# Patient Record
Sex: MALE | Race: WHITE
[De-identification: names, ages, dates, MRNs, and addresses within clinical notes are randomized per-mention and may not be internally consistent; named-entity substitution may affect disease eponyms.]

---

## 2018-10-09 ENCOUNTER — HOSPITAL ENCOUNTER (EMERGENCY)
Dept: HOSPITAL 47 - EC | Age: 33
Discharge: HOME | End: 2018-10-09
Payer: COMMERCIAL

## 2018-10-09 VITALS — DIASTOLIC BLOOD PRESSURE: 69 MMHG | HEART RATE: 78 BPM | TEMPERATURE: 98.5 F | SYSTOLIC BLOOD PRESSURE: 143 MMHG

## 2018-10-09 VITALS — RESPIRATION RATE: 16 BRPM

## 2018-10-09 DIAGNOSIS — E11.65: ICD-10-CM

## 2018-10-09 DIAGNOSIS — Z76.0: ICD-10-CM

## 2018-10-09 DIAGNOSIS — Z91.14: ICD-10-CM

## 2018-10-09 DIAGNOSIS — Z88.8: ICD-10-CM

## 2018-10-09 DIAGNOSIS — J20.9: Primary | ICD-10-CM

## 2018-10-09 DIAGNOSIS — F17.200: ICD-10-CM

## 2018-10-09 LAB
ALBUMIN SERPL-MCNC: 4.1 G/DL (ref 3.5–5)
ALP SERPL-CCNC: 74 U/L (ref 38–126)
ALT SERPL-CCNC: 19 U/L (ref 21–72)
ANION GAP SERPL CALC-SCNC: 10 MMOL/L
AST SERPL-CCNC: 17 U/L (ref 17–59)
BASOPHILS # BLD AUTO: 0.1 K/UL (ref 0–0.2)
BASOPHILS NFR BLD AUTO: 1 %
BUN SERPL-SCNC: 7 MG/DL (ref 9–20)
CALCIUM SPEC-MCNC: 9.5 MG/DL (ref 8.4–10.2)
CHLORIDE SERPL-SCNC: 102 MMOL/L (ref 98–107)
CO2 SERPL-SCNC: 27 MMOL/L (ref 22–30)
EOSINOPHIL # BLD AUTO: 0.4 K/UL (ref 0–0.7)
EOSINOPHIL NFR BLD AUTO: 4 %
ERYTHROCYTE [DISTWIDTH] IN BLOOD BY AUTOMATED COUNT: 4.85 M/UL (ref 4.3–5.9)
ERYTHROCYTE [DISTWIDTH] IN BLOOD: 13.8 % (ref 11.5–15.5)
GLUCOSE BLD-MCNC: 228 MG/DL (ref 75–99)
GLUCOSE SERPL-MCNC: 218 MG/DL (ref 74–99)
GLUCOSE UR QL: (no result)
HCT VFR BLD AUTO: 42.1 % (ref 39–53)
HGB BLD-MCNC: 14.1 GM/DL (ref 13–17.5)
LYMPHOCYTES # SPEC AUTO: 3.3 K/UL (ref 1–4.8)
LYMPHOCYTES NFR SPEC AUTO: 29 %
MCH RBC QN AUTO: 29 PG (ref 25–35)
MCHC RBC AUTO-ENTMCNC: 33.4 G/DL (ref 31–37)
MCV RBC AUTO: 86.8 FL (ref 80–100)
MONOCYTES # BLD AUTO: 0.5 K/UL (ref 0–1)
MONOCYTES NFR BLD AUTO: 4 %
NEUTROPHILS # BLD AUTO: 7.1 K/UL (ref 1.3–7.7)
NEUTROPHILS NFR BLD AUTO: 62 %
PH UR: 6 [PH] (ref 5–8)
PLATELET # BLD AUTO: 270 K/UL (ref 150–450)
POTASSIUM SERPL-SCNC: 3.7 MMOL/L (ref 3.5–5.1)
PROT SERPL-MCNC: 6.8 G/DL (ref 6.3–8.2)
RBC UR QL: <1 /HPF (ref 0–5)
SODIUM SERPL-SCNC: 139 MMOL/L (ref 137–145)
SP GR UR: 1.02 (ref 1–1.03)
UROBILINOGEN UR QL STRIP: 2 MG/DL (ref ?–2)
WBC # BLD AUTO: 11.5 K/UL (ref 3.8–10.6)
WBC #/AREA URNS HPF: <1 /HPF (ref 0–5)

## 2018-10-09 PROCEDURE — 71046 X-RAY EXAM CHEST 2 VIEWS: CPT

## 2018-10-09 PROCEDURE — 82009 KETONE BODYS QUAL: CPT

## 2018-10-09 PROCEDURE — 85025 COMPLETE CBC W/AUTO DIFF WBC: CPT

## 2018-10-09 PROCEDURE — 80053 COMPREHEN METABOLIC PANEL: CPT

## 2018-10-09 PROCEDURE — 99284 EMERGENCY DEPT VISIT MOD MDM: CPT

## 2018-10-09 PROCEDURE — 81001 URINALYSIS AUTO W/SCOPE: CPT

## 2018-10-09 PROCEDURE — 36415 COLL VENOUS BLD VENIPUNCTURE: CPT

## 2018-10-09 PROCEDURE — 96360 HYDRATION IV INFUSION INIT: CPT

## 2018-10-09 NOTE — XR
EXAMINATION: XR chest 2V

 

DATE AND TIME:  10/9/2018 9:02 PM

 

CLINICAL INDICATION: Pain

 

TECHNIQUE: PA and lateral

 

COMPARISON: None.

 

FINDINGS: 

The lungs are clear. 

 

The pleural spaces are negative.

 

The cardiac silhouette is not enlarged. 

 

The remainder of the mediastinal silhouette is unremarkable. 

 

The skeletal structures and soft tissues are negative for acute findings.

 

IMPRESSION:

NO ACUTE PROCESS.

## 2018-10-09 NOTE — ED
General Adult HPI





- General


Source: patient


Mode of arrival: ambulatory


Limitations: no limitations





<Adelina Vidal - Last Filed: 10/10/18 02:46>





<Daniela Cummings - Last Filed: 10/10/18 06:14>





- General


Chief complaint: Recheck/Abnormal Lab/Rx


Stated complaint: med refill/diabetic blurred vision


Time Seen by Provider: 10/09/18 18:46





- History of Present Illness


Initial comments: 


33-year-old male patient with past medical history significant for type 2 

diabetes mellitus presents to the emergency department today for evaluation of 

intermittent blurred vision and polyuria.  Patient states he has been having 

episodes of lightheadedness and swelling in his feet as well.  Patient states 

he has been out of his metformin for the last 3 months due to insurance issues.

  Patient states he is having the same symptoms as when he found out he had 

diabetes.  Patient states he is also sick with upper respiratory symptoms 

including nasal congestion, sore throat, and cough.  Patient states he is 

coughing up green sputum.  States he occasionally becomes short of breath with 

this.  He denies any fevers, chills, chest pain, hemoptysis, or ear pain.  

Patient also does not have a blood glucose meter so has not been checking his 

blood sugar. Patient denies any recent rash, abdominal pain, nausea, vomiting, 

diarrhea, constipation, back pain, numbness, tingling, dizziness, weakness, 

hematuria, dysuria, urinary urgency, urinary frequency, headache, visual changes

, or any other complaints.


 (Adelina Vidal)





- Related Data


 Home Medications











 Medication  Instructions  Recorded  Confirmed


 


diphenhydrAMINE HCL [Benadryl] 25 mg PO HS PRN 10/09/18 10/09/18








 Previous Rx's











 Medication  Instructions  Recorded


 


metFORMIN HCL [Glucophage] 500 mg PO BID #60 tab 04/09/16


 


Albuterol Sulfate [Proair Hfa] 1 - 2 puff INHALATION Q6HR PRN #1 10/09/18





 inhaler 


 


guaiFENesin-/30MG [Mucinex 1 each PO Q12HR #10 tab.er.12h 10/09/18





Dm]  


 


metFORMIN HCL [Glucophage] 500 mg PO BID #60 tab 10/09/18











 Allergies











Allergy/AdvReac Type Severity Reaction Status Date / Time


 


pseudoephedrine HCl Allergy  Unknown Verified 10/09/18 18:50





[From [facility]]   Childhood  














Review of Systems


ROS Other: All systems not noted in ROS Statement are negative.





<Adelina Vidal - Last Filed: 10/10/18 02:46>


ROS Other: All systems not noted in ROS Statement are negative.





<CummingsDaniela P - Last Filed: 10/10/18 06:14>


ROS Statement: 


Those systems with pertinent positive or pertinent negative responses have been 

documented in the HPI.








Past Medical History


Past Medical History: Diabetes Mellitus, Hyperlipidemia


History of Any Multi-Drug Resistant Organisms: None Reported


Past Surgical History: No Surgical Hx Reported


Past Psychological History: ADD/ADHD, Anxiety, Depression


Smoking Status: Current every day smoker


Past Alcohol Use History: None Reported


Past Drug Use History: None Reported





<Adelina Vidal - Last Filed: 10/10/18 02:46>





General Exam


Limitations: no limitations


General appearance: alert, in no apparent distress, other (This is a well-

developed, well-nourished adult male patient in no acute distress.  Vital signs 

upon presentation are temperature 98.2F, pulse 96, respirations 16, blood 

pressure 119/74, pulse ox 99% on room air.)


Eye exam: Present: normal appearance, PERRL, EOMI.  Absent: scleral icterus, 

conjunctival injection, periorbital swelling


ENT exam: Present: normal exam, normal oropharynx, mucous membranes moist


Respiratory exam: Present: normal lung sounds bilaterally.  Absent: respiratory 

distress, wheezes, rales, rhonchi, stridor


Cardiovascular Exam: Present: regular rate, normal rhythm, normal heart sounds.

  Absent: systolic murmur, diastolic murmur, rubs, gallop, clicks


Neurological exam: Present: alert, oriented X3, CN II-XII intact


Psychiatric exam: Present: normal affect, normal mood


Skin exam: Present: warm, dry, intact, normal color.  Absent: rash





<Adelina Vidal - Last Filed: 10/10/18 02:46>





 Vital Signs











  10/09/18 10/09/18





  18:05 21:27


 


Temperature 98.2 F 98.5 F


 


Pulse Rate 96 78


 


Respiratory 16 16





Rate  


 


Blood Pressure 119/74 143/69


 


O2 Sat by Pulse 99 98





Oximetry  














Medical Decision Making





- Lab Data


Result diagrams: 


 10/09/18 19:21





 10/09/18 19:21





- Radiology Data


Radiology results: report reviewed, image reviewed





<Adelina Vidal - Last Filed: 10/10/18 02:46>





- Lab Data


Result diagrams: 


 10/09/18 19:21





 10/09/18 19:21





<Daniela Cummings - Last Filed: 10/10/18 06:14>





- Medical Decision Making


33-year-old male patient presents to the emergency department today for 

complaints of blurred vision and polyuria.  Patient reported history of type 2 

diabetes with noncompliance over the last 3 months.  Patient is also dispensing 

upper respiratory symptoms.  Physical examination is relatively unremarkable 

visual acuity was performed and vision was 20/20 in both eyes.  Blood sugar was 

228 here in the department.  Chest x-ray showed no acute cardiopulmonary 

process.  Other labs are unremarkable.  I did discuss findings and results with 

patient.  Did discuss his symptoms are most likely related to viral upper 

respiratory prescription for Mucinex D and his refill his prescription for 

metformin.  Patient very upset and a visit yelling how he did not help him or 

care for him.  He is requesting a antibiotic, I did tell him that this was not 

necessary as his symptoms are most likely viral in nature.  He is instructed to 

follow-up with his primary care physician for recheck as soon as possible.  

Return parameters discussed in detail.  He verbalizes understanding.


 (Adelina Vidal)


I was available for consultation in the emergency department. The history and 

physical exam were done by the midlevel provider.  I was consulted for this 

patient's care.  I reviewed the case with the midlevel provider and based on 

their presentation of the patient, I agree with the assessment, medical 

decision making and plan of care as documented.


 (Daniela Cummings)





- Lab Data





 Lab Results











  10/09/18 10/09/18 10/09/18 Range/Units





  19:21 19:21 19:21 


 


WBC   11.5 H   (3.8-10.6)  k/uL


 


RBC   4.85   (4.30-5.90)  m/uL


 


Hgb   14.1   (13.0-17.5)  gm/dL


 


Hct   42.1   (39.0-53.0)  %


 


MCV   86.8   (80.0-100.0)  fL


 


MCH   29.0   (25.0-35.0)  pg


 


MCHC   33.4   (31.0-37.0)  g/dL


 


RDW   13.8   (11.5-15.5)  %


 


Plt Count   270   (150-450)  k/uL


 


Neutrophils %   62   %


 


Lymphocytes %   29   %


 


Monocytes %   4   %


 


Eosinophils %   4   %


 


Basophils %   1   %


 


Neutrophils #   7.1   (1.3-7.7)  k/uL


 


Lymphocytes #   3.3   (1.0-4.8)  k/uL


 


Monocytes #   0.5   (0-1.0)  k/uL


 


Eosinophils #   0.4   (0-0.7)  k/uL


 


Basophils #   0.1   (0-0.2)  k/uL


 


Sodium    139  (137-145)  mmol/L


 


Potassium    3.7  (3.5-5.1)  mmol/L


 


Chloride    102  ()  mmol/L


 


Carbon Dioxide    27  (22-30)  mmol/L


 


Anion Gap    10  mmol/L


 


BUN    7 L  (9-20)  mg/dL


 


Creatinine    0.77  (0.66-1.25)  mg/dL


 


Est GFR (CKD-EPI)AfAm    >90  (>60 ml/min/1.73 sqM)  


 


Est GFR (CKD-EPI)NonAf    >90  (>60 ml/min/1.73 sqM)  


 


Glucose    218 H  (74-99)  mg/dL


 


POC Glucose (mg/dL)     (75-99)  mg/dL


 


POC Glu Operater ID     


 


Calcium    9.5  (8.4-10.2)  mg/dL


 


Total Bilirubin    0.4  (0.2-1.3)  mg/dL


 


AST    17  (17-59)  U/L


 


ALT    19 L  (21-72)  U/L


 


Alkaline Phosphatase    74  ()  U/L


 


Total Protein    6.8  (6.3-8.2)  g/dL


 


Albumin    4.1  (3.5-5.0)  g/dL


 


Urine Color  Yellow    


 


Urine Appearance  Clear    (Clear)  


 


Urine pH  6.0    (5.0-8.0)  


 


Ur Specific Gravity  1.018    (1.001-1.035)  


 


Urine Protein  Trace H    (Negative)  


 


Urine Glucose (UA)  2+ H    (Negative)  


 


Urine Ketones  Negative    (Negative)  


 


Urine Blood  Negative    (Negative)  


 


Urine Nitrite  Negative    (Negative)  


 


Urine Bilirubin  Negative    (Negative)  


 


Urine Urobilinogen  2.0    (<2.0)  mg/dL


 


Ur Leukocyte Esterase  Moderate H    (Negative)  


 


Urine RBC  <1    (0-5)  /hpf


 


Urine WBC  <1    (0-5)  /hpf


 


Urine Mucus  Rare H    (None)  /hpf


 


Acetone, Qual    Negative  (Negative)  














  10/09/18 Range/Units





  19:30 


 


WBC   (3.8-10.6)  k/uL


 


RBC   (4.30-5.90)  m/uL


 


Hgb   (13.0-17.5)  gm/dL


 


Hct   (39.0-53.0)  %


 


MCV   (80.0-100.0)  fL


 


MCH   (25.0-35.0)  pg


 


MCHC   (31.0-37.0)  g/dL


 


RDW   (11.5-15.5)  %


 


Plt Count   (150-450)  k/uL


 


Neutrophils %   %


 


Lymphocytes %   %


 


Monocytes %   %


 


Eosinophils %   %


 


Basophils %   %


 


Neutrophils #   (1.3-7.7)  k/uL


 


Lymphocytes #   (1.0-4.8)  k/uL


 


Monocytes #   (0-1.0)  k/uL


 


Eosinophils #   (0-0.7)  k/uL


 


Basophils #   (0-0.2)  k/uL


 


Sodium   (137-145)  mmol/L


 


Potassium   (3.5-5.1)  mmol/L


 


Chloride   ()  mmol/L


 


Carbon Dioxide   (22-30)  mmol/L


 


Anion Gap   mmol/L


 


BUN   (9-20)  mg/dL


 


Creatinine   (0.66-1.25)  mg/dL


 


Est GFR (CKD-EPI)AfAm   (>60 ml/min/1.73 sqM)  


 


Est GFR (CKD-EPI)NonAf   (>60 ml/min/1.73 sqM)  


 


Glucose   (74-99)  mg/dL


 


POC Glucose (mg/dL)  228 H  (75-99)  mg/dL


 


POC Glu Operater ID  Cat Bhatia  


 


Calcium   (8.4-10.2)  mg/dL


 


Total Bilirubin   (0.2-1.3)  mg/dL


 


AST   (17-59)  U/L


 


ALT   (21-72)  U/L


 


Alkaline Phosphatase   ()  U/L


 


Total Protein   (6.3-8.2)  g/dL


 


Albumin   (3.5-5.0)  g/dL


 


Urine Color   


 


Urine Appearance   (Clear)  


 


Urine pH   (5.0-8.0)  


 


Ur Specific Gravity   (1.001-1.035)  


 


Urine Protein   (Negative)  


 


Urine Glucose (UA)   (Negative)  


 


Urine Ketones   (Negative)  


 


Urine Blood   (Negative)  


 


Urine Nitrite   (Negative)  


 


Urine Bilirubin   (Negative)  


 


Urine Urobilinogen   (<2.0)  mg/dL


 


Ur Leukocyte Esterase   (Negative)  


 


Urine RBC   (0-5)  /hpf


 


Urine WBC   (0-5)  /hpf


 


Urine Mucus   (None)  /hpf


 


Acetone, Qual   (Negative)  














- Radiology Data


Two-view x-ray of the chest is obtained.  Report reviewed in its entirety.  

Impression by Dr. Maciej Baires shows no acute process. (Adelina Vidal)





Disposition


Is patient prescribed a controlled substance at d/c from ED?: No


Time of Disposition: 21:18





<Adelina Vidal - Last Filed: 10/10/18 02:46>





<Daniela Cummings - Last Filed: 10/10/18 06:14>


Clinical Impression: 


 Acute bronchitis, Noncompliance with diabetes treatment, Hyperglycemia





Disposition: HOME SELF-CARE


Condition: Good


Instructions:  Acute Bronchitis (ED), Diabetic Hyperglycemia (ED)


Additional Instructions: 


Take medications as directed.  Increase fluids.  Follow up with the primary 

care physician for recheck in 1-2 days.  Return here immediately for any new, 

worsening, or concerning symptoms.


Prescriptions: 


Albuterol Sulfate [Proair Hfa] 1 - 2 puff INHALATION Q6HR PRN #1 inhaler


 PRN Reason: Shortness Of Breath


guaiFENesin-/30MG [Mucinex Dm] 1 each PO Q12HR #10 tab.er.12h


metFORMIN HCL [Glucophage] 500 mg PO BID #60 tab


Referrals: 


Riri Almanzar MD [REFERRING] - 1-2 days

## 2022-01-28 ENCOUNTER — HOSPITAL ENCOUNTER (OUTPATIENT)
Dept: HOSPITAL 47 - RADXRMAIN | Age: 37
Discharge: HOME | End: 2022-01-28
Payer: COMMERCIAL

## 2022-01-28 DIAGNOSIS — M54.2: ICD-10-CM
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
## 2022-01-28 DIAGNOSIS — T14.91XA: Primary | ICD-10-CM

## 2022-01-28 PROCEDURE — 70360 X-RAY EXAM OF NECK: CPT

## 2022-01-28 PROCEDURE — 72050 X-RAY EXAM NECK SPINE 4/5VWS: CPT

## 2022-01-28 NOTE — XR
Soft tissue neck

 

HISTORY: Suicide attempt, neck pain

 

2 views of the neck

 

Correlation to cervical spine exam 1/28/2022

 

Airway is patent. No radio opaque foreign body. Epiglottis is unremarkable. The profile. Bone mineral
ization is maintained.

 

IMPRESSION: No acute abnormality

## 2022-01-28 NOTE — XR
Cervical spine

 

HISTORY: Suicide attempt 1/26/2022, pain

 

6 views of the cervical spine

 

Rudimentary cervical ribs are present. Thoracic spine shows a probable curvature. Cervical vertebral 
bodies show preserved height, alignment, and bone mineralization. No evident foraminal encroachment o
n oblique views. Disc spaces and prevertebral soft tissues are normal.

 

IMPRESSION: No fracture or subluxation.